# Patient Record
Sex: FEMALE | Race: WHITE | NOT HISPANIC OR LATINO | URBAN - METROPOLITAN AREA
[De-identification: names, ages, dates, MRNs, and addresses within clinical notes are randomized per-mention and may not be internally consistent; named-entity substitution may affect disease eponyms.]

---

## 2022-06-26 ENCOUNTER — EMERGENCY (EMERGENCY)
Facility: HOSPITAL | Age: 77
LOS: 1 days | Discharge: HOME | End: 2022-06-26
Attending: EMERGENCY MEDICINE | Admitting: EMERGENCY MEDICINE
Payer: MEDICARE

## 2022-06-26 VITALS
OXYGEN SATURATION: 95 % | HEART RATE: 86 BPM | DIASTOLIC BLOOD PRESSURE: 81 MMHG | TEMPERATURE: 99 F | WEIGHT: 154.98 LBS | HEIGHT: 66 IN | SYSTOLIC BLOOD PRESSURE: 127 MMHG | RESPIRATION RATE: 18 BRPM

## 2022-06-26 DIAGNOSIS — Z88.0 ALLERGY STATUS TO PENICILLIN: ICD-10-CM

## 2022-06-26 DIAGNOSIS — S09.90XA UNSPECIFIED INJURY OF HEAD, INITIAL ENCOUNTER: ICD-10-CM
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
## 2022-06-26 DIAGNOSIS — Z88.8 ALLERGY STATUS TO OTHER DRUGS, MEDICAMENTS AND BIOLOGICAL SUBSTANCES STATUS: ICD-10-CM

## 2022-06-26 DIAGNOSIS — S00.212A ABRASION OF LEFT EYELID AND PERIOCULAR AREA, INITIAL ENCOUNTER: ICD-10-CM
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
## 2022-06-26 PROCEDURE — 72125 CT NECK SPINE W/O DYE: CPT | Mod: MG

## 2022-06-26 PROCEDURE — G1004: CPT

## 2022-06-26 PROCEDURE — 72125 CT NECK SPINE W/O DYE: CPT | Mod: 26,MG

## 2022-06-26 PROCEDURE — 70480 CT ORBIT/EAR/FOSSA W/O DYE: CPT | Mod: 26,MG,59

## 2022-06-26 PROCEDURE — 70450 CT HEAD/BRAIN W/O DYE: CPT | Mod: 26,MG

## 2022-06-26 PROCEDURE — 70480 CT ORBIT/EAR/FOSSA W/O DYE: CPT | Mod: MG

## 2022-06-26 PROCEDURE — 99284 EMERGENCY DEPT VISIT MOD MDM: CPT | Mod: 25

## 2022-06-26 PROCEDURE — 99284 EMERGENCY DEPT VISIT MOD MDM: CPT | Mod: FS

## 2022-06-26 PROCEDURE — 70450 CT HEAD/BRAIN W/O DYE: CPT | Mod: MG

## 2022-06-26 RX ORDER — BACITRACIN ZINC 500 UNIT/G
1 OINTMENT IN PACKET (EA) TOPICAL ONCE
Refills: 0 | Status: COMPLETED | OUTPATIENT
Start: 2022-06-26 | End: 2022-06-26

## 2022-06-26 RX ORDER — ACETAMINOPHEN 500 MG
650 TABLET ORAL ONCE
Refills: 0 | Status: COMPLETED | OUTPATIENT
Start: 2022-06-26 | End: 2022-06-26

## 2022-06-26 RX ADMIN — Medication 650 MILLIGRAM(S): at 12:48

## 2022-06-26 RX ADMIN — Medication 1 APPLICATION(S): at 12:49

## 2022-06-26 NOTE — ED PROVIDER NOTE - MUSCULOSKELETAL, MLM
Spine appears normal, range of motion is not limited, no muscle or joint tenderness; no Cspine tend, FROM

## 2022-06-26 NOTE — ED PROVIDER NOTE - PATIENT PORTAL LINK FT
You can access the FollowMyHealth Patient Portal offered by [facility] by registering at the following website: [email or web address]. By joining [facility]’s FollowMyHealth portal, you will also be able to view your health information using other applications (apps) compatible with our system.

## 2022-06-26 NOTE — ED PROVIDER NOTE - ENMT, MLM
Airway patent, Nasal mucosa clear. Mouth with normal mucosa. Throat has no vesicles, no oropharyngeal exudates and uvula is midline. + L orbital swelling, a 2mm abrasion to superior L orbit, no active bleeding, no lac to suture.  Ears: no hemotympanum b/l.  No alaniz signs b/l, no raccoon eyes b/l, no step offs.

## 2022-06-26 NOTE — ED PROVIDER NOTE - NSFOLLOWUPINSTRUCTIONS_ED_ALL_ED_FT
Excision is okay   Closed Head Injury    A closed head injury is an injury to your head that may or may not involve a traumatic brain injury (TBI). Symptoms of TBI can be short or long lasting and include headache, dizziness, interference with memory or speech, fatigue, confusion, changes in sleep, mood changes, nausea, depression/anxiety, and dulling of senses. Make sure to obtain proper rest which includes getting plenty of sleep, avoiding excessive visual stimulation, and avoiding activities that may cause physical or mental stress. Avoid any situation where there is potential for another head injury, including sports.    SEEK IMMEDIATE MEDICAL CARE IF YOU HAVE ANY OF THE FOLLOWING SYMPTOMS: unusual drowsiness, vomiting, severe dizziness, seizures, lightheadedness, muscular weakness, different pupil sizes, visual changes, or clear or bloody discharge from your ears or nose. Closed Head Injury  keep abrasion clean and dry, wash with water and soap, apply bacitracin and cover with band aid, ice face, tylenol as needed for pain  A closed head injury is an injury to your head that may or may not involve a traumatic brain injury (TBI). Symptoms of TBI can be short or long lasting and include headache, dizziness, interference with memory or speech, fatigue, confusion, changes in sleep, mood changes, nausea, depression/anxiety, and dulling of senses. Make sure to obtain proper rest which includes getting plenty of sleep, avoiding excessive visual stimulation, and avoiding activities that may cause physical or mental stress. Avoid any situation where there is potential for another head injury, including sports.    SEEK IMMEDIATE MEDICAL CARE IF YOU HAVE ANY OF THE FOLLOWING SYMPTOMS: unusual drowsiness, vomiting, severe dizziness, seizures, lightheadedness, muscular weakness, different pupil sizes, visual changes, or clear or bloody discharge from your ears or nose.

## 2022-06-26 NOTE — ED PROVIDER NOTE - NS ED ATTENDING STATEMENT MOD
This was a shared visit with the CHARITY. I reviewed and verified the documentation and independently performed the documented:

## 2022-06-26 NOTE — ED ADULT NURSE NOTE - CINV DISCH TEACH PARTICIP
Lab Result Notifications    · If labs were ordered at your appointment today, we will contact you with results once all your labs have resulted. Please note certain hormone labs can take up to 10 business days to result.     Prescription Policy     Our goal is to serve you on a more timely basis. Currently, our office receives a large volume of phone requests for medication refills. We are requesting your cooperation with the following:    · Look over your medications, diabetic supplies, etc. before coming to your appointment to see if you need any refills.    · Request your medication (or supply) refills during your office visit.    · Outside of an office visit, requests should be directed to your pharmacy even if you have zero refills. Call your pharmacy after 72 hours to see if your prescription is ready for pick-up.     Pharmacy Refills: All prescriptions take 48-72 hours to refill.          Our Patients are Important    Our goal is for your appointment to start at your appointment time.     Please arrive 15 minutes early to allow our staff adequate time to prepare you for your visit to meet with your provider at the scheduled appointment time.     Additionally, if you need to cancel or change your appointment our clinic requests 24 - 48 hours notice, to allow us to refill that open appointment.     We want to improve and you can help. You may receive a survey asking you about this visit. Please complete this survey; we will use your feedback to make improvements. Thank you.             Patient

## 2022-06-26 NOTE — ED PROVIDER NOTE - CONDITION AT DISCHARGE:
Patient's mother called and wants to know if she needs to come in to get any shots, due to her age? Please call mother back.   Improved

## 2022-06-26 NOTE — ED ADULT TRIAGE NOTE - CHIEF COMPLAINT QUOTE
Pt presents to ED c/o mechanical trip and fall off curb in [facility]. +head strike, laceration noted to L brow. Denies LOC, use of blood thinners, dizziness, confusion. Presents to ED with C collar in place by EMS.

## 2022-06-26 NOTE — ED ADULT NURSE NOTE - CHIEF COMPLAINT QUOTE
Pt presents to ED c/o mechanical trip and fall off curb in [facility]. +head strike, laceration noted to L brow. Denies LOC, use of blood thinners, dizziness, confusion. Presents to ED with C collar in place by EMS.

## 2022-06-26 NOTE — ED PROVIDER NOTE - ATTENDING APP SHARED VISIT CONTRIBUTION OF CARE
78 yo female c/o mechanical trip/fall on curb w chi, facial trauma.  No loc, ha, change in vision/speech/gait, numbness or weakness in ext, neck/back pain, other injury.  Td utd.  Well appearing, nad, nc, ecchymosis L temple w abrasion lateral eyebrow, + mild ttp over ecchymotic area, no other bony ttp, neck and back nontender midline, ext no gross deformity, no gross neuro deficits  Pt w chi, facial trauma - low concern for sig injury but will check ct head and face.  Likely dc.

## 2022-06-26 NOTE — ED ADULT NURSE NOTE - NSIMPLEMENTINTERV_GEN_ALL_ED
Implemented All Fall Risk Interventions:  [place] to call system. Call bell, personal items and telephone within reach. Instruct patient to call for assistance. Room bathroom lighting operational. Non-slip footwear when patient is off stretcher. Physically safe environment: no spills, clutter or unnecessary equipment. Stretcher in lowest position, wheels locked, appropriate side rails in place. Provide visual cue, wrist band, yellow gown, etc. Monitor gait and stability. Monitor for mental status changes and reorient to person, place, and time. Review medications for side effects contributing to fall risk. Reinforce activity limits and safety measures with patient and family.

## 2022-06-26 NOTE — ED ADULT NURSE NOTE - OBJECTIVE STATEMENT
pt s/p mechanical fall in [place] , hit head on ground , hematoma and small laceration on left forehead, no LOC , slight nausea, no neck pain , pt arrived to ED with cervical collar in place pt s/p mechanical fall in [place] , hit head on ground , hematoma and small laceration on left forehead, abrasion to right inner knee, no LOC , slight nausea, no neck pain , pt arrived to ED with cervical collar in place

## 2022-06-26 NOTE — ED PROVIDER NOTE - OBJECTIVE STATEMENT
The pt is a 76 y/o F, BIBA, s/p trip and fall - hit her head. Pt states some swelling to L side of face, had some bleeding initially, UTD on Td, no blood thinners use. Denies loc, ha, dizziness, visual changes, hearing changes, neck pain, cp, sob, leg pain or injury, any other c/o.

## 2022-06-26 NOTE — ED PROVIDER NOTE - CLINICAL SUMMARY MEDICAL DECISION MAKING FREE TEXT BOX
pt s/p mechanical fall - states that hit head - isolated injury, non focal neuro exam, no blood thinners use, superficial abrasion - no lac to suture, td UTD, abrasion cleaned and dressed by rn, CTs     , stable for dc, head injury precautions discussed, pt understands and agrees w/plan, strict return precautions given pt s/p mechanical fall - states that hit head - isolated injury, non focal neuro exam, no blood thinners use, superficial abrasion - no lac to suture, td UTD, abrasion cleaned and dressed by rn, CTs neg, stable for dc, head injury precautions discussed, pt understands and agrees w/plan, strict return precautions given